# Patient Record
Sex: FEMALE | Race: OTHER | NOT HISPANIC OR LATINO | URBAN - METROPOLITAN AREA
[De-identification: names, ages, dates, MRNs, and addresses within clinical notes are randomized per-mention and may not be internally consistent; named-entity substitution may affect disease eponyms.]

---

## 2019-08-17 ENCOUNTER — EMERGENCY (EMERGENCY)
Facility: HOSPITAL | Age: 44
LOS: 1 days | Discharge: HOME | End: 2019-08-17
Attending: EMERGENCY MEDICINE
Payer: MEDICARE

## 2019-08-17 VITALS
WEIGHT: 95.02 LBS | HEART RATE: 93 BPM | OXYGEN SATURATION: 100 % | RESPIRATION RATE: 16 BRPM | TEMPERATURE: 98 F | DIASTOLIC BLOOD PRESSURE: 81 MMHG | SYSTOLIC BLOOD PRESSURE: 131 MMHG | HEIGHT: 60 IN

## 2019-08-17 LAB
ALBUMIN SERPL ELPH-MCNC: 3.7 G/DL — SIGNIFICANT CHANGE UP (ref 3.3–5)
ALP SERPL-CCNC: 145 U/L — HIGH (ref 40–120)
ALT FLD-CCNC: 18 U/L — SIGNIFICANT CHANGE UP (ref 10–45)
ANION GAP SERPL CALC-SCNC: 10 MMOL/L — SIGNIFICANT CHANGE UP (ref 5–17)
APPEARANCE UR: CLEAR — SIGNIFICANT CHANGE UP
APTT BLD: 38.7 SEC — HIGH (ref 27.5–36.3)
AST SERPL-CCNC: 22 U/L — SIGNIFICANT CHANGE UP (ref 10–40)
BACTERIA # UR AUTO: NEGATIVE — SIGNIFICANT CHANGE UP
BASOPHILS # BLD AUTO: 0 K/UL — SIGNIFICANT CHANGE UP (ref 0–0.2)
BASOPHILS NFR BLD AUTO: 0 % — SIGNIFICANT CHANGE UP (ref 0–2)
BILIRUB SERPL-MCNC: 0.2 MG/DL — SIGNIFICANT CHANGE UP (ref 0.2–1.2)
BILIRUB UR-MCNC: NEGATIVE — SIGNIFICANT CHANGE UP
BUN SERPL-MCNC: 12 MG/DL — SIGNIFICANT CHANGE UP (ref 7–23)
CALCIUM SERPL-MCNC: 9.1 MG/DL — SIGNIFICANT CHANGE UP (ref 8.4–10.5)
CHLORIDE SERPL-SCNC: 105 MMOL/L — SIGNIFICANT CHANGE UP (ref 96–108)
CO2 SERPL-SCNC: 27 MMOL/L — SIGNIFICANT CHANGE UP (ref 22–31)
COLOR SPEC: COLORLESS — SIGNIFICANT CHANGE UP
CREAT SERPL-MCNC: 0.63 MG/DL — SIGNIFICANT CHANGE UP (ref 0.5–1.3)
DIFF PNL FLD: NEGATIVE — SIGNIFICANT CHANGE UP
EOSINOPHIL # BLD AUTO: 0.1 K/UL — SIGNIFICANT CHANGE UP (ref 0–0.5)
EOSINOPHIL NFR BLD AUTO: 0.9 % — SIGNIFICANT CHANGE UP (ref 0–6)
EPI CELLS # UR: 1 /HPF — SIGNIFICANT CHANGE UP
GLUCOSE SERPL-MCNC: 100 MG/DL — HIGH (ref 70–99)
GLUCOSE UR QL: NEGATIVE — SIGNIFICANT CHANGE UP
HCT VFR BLD CALC: 37.7 % — SIGNIFICANT CHANGE UP (ref 34.5–45)
HGB BLD-MCNC: 11.9 G/DL — SIGNIFICANT CHANGE UP (ref 11.5–15.5)
HYALINE CASTS # UR AUTO: 0 /LPF — SIGNIFICANT CHANGE UP (ref 0–2)
INR BLD: 0.97 RATIO — SIGNIFICANT CHANGE UP (ref 0.88–1.16)
KETONES UR-MCNC: NEGATIVE — SIGNIFICANT CHANGE UP
LEUKOCYTE ESTERASE UR-ACNC: NEGATIVE — SIGNIFICANT CHANGE UP
LYMPHOCYTES # BLD AUTO: 1 K/UL — SIGNIFICANT CHANGE UP (ref 1–3.3)
LYMPHOCYTES # BLD AUTO: 14.9 % — SIGNIFICANT CHANGE UP (ref 13–44)
MCHC RBC-ENTMCNC: 29.7 PG — SIGNIFICANT CHANGE UP (ref 27–34)
MCHC RBC-ENTMCNC: 31.7 GM/DL — LOW (ref 32–36)
MCV RBC AUTO: 93.7 FL — SIGNIFICANT CHANGE UP (ref 80–100)
MONOCYTES # BLD AUTO: 0.4 K/UL — SIGNIFICANT CHANGE UP (ref 0–0.9)
MONOCYTES NFR BLD AUTO: 6.3 % — SIGNIFICANT CHANGE UP (ref 2–14)
NEUTROPHILS # BLD AUTO: 5.1 K/UL — SIGNIFICANT CHANGE UP (ref 1.8–7.4)
NEUTROPHILS NFR BLD AUTO: 77.9 % — HIGH (ref 43–77)
NITRITE UR-MCNC: NEGATIVE — SIGNIFICANT CHANGE UP
PH UR: 7.5 — SIGNIFICANT CHANGE UP (ref 5–8)
PLATELET # BLD AUTO: 257 K/UL — SIGNIFICANT CHANGE UP (ref 150–400)
POTASSIUM SERPL-MCNC: 4.6 MMOL/L — SIGNIFICANT CHANGE UP (ref 3.5–5.3)
POTASSIUM SERPL-SCNC: 4.6 MMOL/L — SIGNIFICANT CHANGE UP (ref 3.5–5.3)
PROT SERPL-MCNC: 7 G/DL — SIGNIFICANT CHANGE UP (ref 6–8.3)
PROT UR-MCNC: NEGATIVE — SIGNIFICANT CHANGE UP
PROTHROM AB SERPL-ACNC: 11 SEC — SIGNIFICANT CHANGE UP (ref 10–12.9)
RBC # BLD: 4.02 M/UL — SIGNIFICANT CHANGE UP (ref 3.8–5.2)
RBC # FLD: 12.7 % — SIGNIFICANT CHANGE UP (ref 10.3–14.5)
RBC CASTS # UR COMP ASSIST: 7 /HPF — HIGH (ref 0–4)
SODIUM SERPL-SCNC: 142 MMOL/L — SIGNIFICANT CHANGE UP (ref 135–145)
SP GR SPEC: 1.01 — SIGNIFICANT CHANGE UP (ref 1.01–1.02)
UROBILINOGEN FLD QL: NEGATIVE — SIGNIFICANT CHANGE UP
WBC # BLD: 6.5 K/UL — SIGNIFICANT CHANGE UP (ref 3.8–10.5)
WBC # FLD AUTO: 6.5 K/UL — SIGNIFICANT CHANGE UP (ref 3.8–10.5)
WBC UR QL: 1 /HPF — SIGNIFICANT CHANGE UP (ref 0–5)

## 2019-08-17 PROCEDURE — 99283 EMERGENCY DEPT VISIT LOW MDM: CPT

## 2019-08-17 PROCEDURE — 85027 COMPLETE CBC AUTOMATED: CPT

## 2019-08-17 PROCEDURE — 85730 THROMBOPLASTIN TIME PARTIAL: CPT

## 2019-08-17 PROCEDURE — 99284 EMERGENCY DEPT VISIT MOD MDM: CPT

## 2019-08-17 PROCEDURE — 81001 URINALYSIS AUTO W/SCOPE: CPT

## 2019-08-17 PROCEDURE — 85610 PROTHROMBIN TIME: CPT

## 2019-08-17 PROCEDURE — 80053 COMPREHEN METABOLIC PANEL: CPT

## 2019-08-17 RX ORDER — DIPHENHYDRAMINE HCL 50 MG
1 CAPSULE ORAL
Qty: 25 | Refills: 0
Start: 2019-08-17

## 2019-08-17 RX ORDER — DIPHENHYDRAMINE HCL 50 MG
50 CAPSULE ORAL ONCE
Refills: 0 | Status: COMPLETED | OUTPATIENT
Start: 2019-08-17 | End: 2019-08-17

## 2019-08-17 RX ADMIN — Medication 50 MILLIGRAM(S): at 16:16

## 2019-08-17 NOTE — ED PROVIDER NOTE - CLINICAL SUMMARY MEDICAL DECISION MAKING FREE TEXT BOX
Patient. with increasing dystonia in b/l arms.  Dystonia improved in the ED.  Neurology evalulated pt. and pt. to f/u with her neurologist as an outpatient.  Patient. prescribed benadryl for symptomatic relief.  Patient. labs wnl including u/a.  Patient. stable for outpatient f/u of acute on chronic dystonia that has improved in the ED.

## 2019-08-17 NOTE — ED ADULT NURSE NOTE - OBJECTIVE STATEMENT
Pt bib parents for eval of worsening dystonia.  She was dx'd January 2018 with this and reports that sx worsened since March of this year.  She is alert and interactive, with almost constant, uncontrolled  movement of arms.  Multiple abrasions present on anterior aspect of both lower legs, which she states is from frequent falls @ home.

## 2019-08-17 NOTE — ED PROVIDER NOTE - PROGRESS NOTE DETAILS
Patient. feels improved.  Patient. seen by neurology who agrees with outpatient f/u and pt. is improved.  Patient. stable and will receive benadryl for outpatient as needed.  Patient. has outpatient neurologist who she follows with.

## 2019-08-17 NOTE — CONSULT NOTE ADULT - SUBJECTIVE AND OBJECTIVE BOX
HPI:  44F w/ PMH of ?Tardive Dystonia (Follows w/ Dr. Turner at [facility]) presents here complaining of worsening of her dystonic contractions.     Patient states shes had worsening of her dystonic movements for the past two weeks. It has progressed to the point where she needed to come into the hospital. Last appointment w/ her Movement Disorder specialist, Dr. Romero. She is currently on Ingrezza 80mg. She was recently started on Tetrabenazine however patient states she stopped taking it because it has not been helping. Patient first diagnosed w/ Dystonia in June 2018 and states her disease is worsening.     A 10-system ROS was performed and is negative except for those items noted above and/or in the HPI.    SOCIAL HISTORY:   No smoke, drink, drugs     MEDICATIONS:   Home Medications:    VITALS & EXAMINATION:  T(F): 98.3  HR: 93  BP: 131/81  RR: 16  SpO2: 100%    Physical Exam:   General appearance: distressed, crying  Cardiac: RRR. No carotid bruits.   Pulm: CTAB              Neurologic:  - MS:  Alert & Oriented to Name, Hospital, today's date. Names Watch, Badge, Pen. Follows commands demonstrating two fingers and points to window; Speech is fluent with intact naming, repetition, and comprehension;   - CN II-XII:  VFF, EOMI, PERRLA, V1-V3 intact, no facial asymmetry, t/p midline, SCM/trap intact.  - Motor:  Choreiform movements bilaterally (upper > lower extremities). Atrophied legs. Full strength throughout.   - Reflexes:  2+ and symmetric bilaterally at the biceps, triceps, brachioradialis, knees, and ankles.  Plantar responses flexor.  - Sensory:  Intact to light touch, pin prick, vibration, and joint-position sense throughout.  - Coordination:  Finger-nose-finger and heel-knee-shin intact without dysmetria.  Rapid alternating hand movements intact.  - Gait:   Deferred     LABS:  08-17 Na 142  08-17 K 4.6  08-17 P --  08-17 Mg --  08-17 Ca 9.1  08-17 Cr 0.63      08-17 WBC 6.5  08-17 HgB 11.9; Hct 37.7; MCV 93.7  08-17 Plt 257    08-17 PT 11.0   08-17 INR 0.97     08-17 aPTT 38.7            IMAGING:

## 2019-08-17 NOTE — ED PROVIDER NOTE - ATTENDING CONTRIBUTION TO CARE
I, Matthew Barrientos, performed a history and physical exam of the patient and discussed their management with the resident and /or advanced care provider. I reviewed the resident and /or ACP's note and agree with the documented findings and plan of care. My medical decison making and observations are found above.

## 2019-08-17 NOTE — ED PROVIDER NOTE - PHYSICAL EXAMINATION
NAD, VSS, A&Ox3, + Generalized dystonia to b/l upper extremities. Diminished sensroy of RUE. Lungs clear. ABD soft, non tender. 5/5 strength of all extremities.

## 2019-08-17 NOTE — CONSULT NOTE ADULT - ASSESSMENT
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 Turner at [place]) presents here complaining of worsening of her dystonic contractions.     Impression: Worsening Tardive Dystonia w/ no clear exacerbating source.     Plan:   [] Patient can follow up w/ outpatient Neurologist at [facility], Dr. Romero, to discuss alternative treatment options. No acute indication for hospitalization.

## 2019-08-17 NOTE — ED ADULT NURSE NOTE - CHPI ED NUR SYMPTOMS NEG
no nausea/no dizziness/no vomiting/no loss of consciousness/no weakness/no blurred vision/no change in level of consciousness/no fever/no confusion

## 2019-08-17 NOTE — ED PROVIDER NOTE - OBJECTIVE STATEMENT
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 Pt stated she's right sided dystonia since 1/2019 and noticed gradual worsen both arm dystonia. She is on f/u with [facility] (last visit was 2 1/2weeks ago) and started new medication, Ingrezza for Dystonia without improvement. Pt reported her symptom's worsen with sitting or standing with right sided headache/back pain and intermittent SOB. Denies fever, chills or cough/ congestion. Denies CP/ABD pain or N/V/D. Denies urinary burning pain, but she feels frequent urination often.

## 2024-09-03 NOTE — ED ADULT NURSE NOTE - NSIMPLEMENTINTERV_GEN_ALL_ED
Yoly MIRANDA JUANITA Tipton has an upcoming lab appointment:    Future Appointments   Date Time Provider Department Center   9/6/2024  1:00 PM CR LAB [facility] CR   11/7/2024  3:30 PM Cristopher Goldberg MD [facility]   1/10/2025  8:00 AM Vivian Alcocer PA-C [facility]   Per virtual visit encounter from 8/30/24:  Plan:  Establish with an BED clinic/therapist - letter will need to be provided and PATRICIA   Set up a nurse only visit at the closest [facility] to get weight, blood pressure and pulse taken. Please ask that they forward the results.   Get lab drawn  3.   Have 3 meals   4.   Continue wegovy 1.7 mg. Has enough through November  FOLLOW-UP:  January with Vivian Alcocer PA-C      There is no order available. Please review and place either future orders or HMPO (Review of Health Maintenance Protocol Orders), as appropriate.    Health Maintenance Due   Topic    ANNUAL REVIEW OF HM ORDERS     HIV SCREENING     HEPATITIS C SCREENING     CHLAMYDIA SCREENING      Thank You,    Shirin COCHRAN III  [facility]  P: [phone]    Implemented All Fall with Harm Risk Interventions:  [place] to call system. Call bell, personal items and telephone within reach. Instruct patient to call for assistance. Room bathroom lighting operational. Non-slip footwear when patient is off stretcher. Physically safe environment: no spills, clutter or unnecessary equipment. Stretcher in lowest position, wheels locked, appropriate side rails in place. Provide visual cue, wrist band, yellow gown, etc. Monitor gait and stability. Monitor for mental status changes and reorient to person, place, and time. Review medications for side effects contributing to fall risk. Reinforce activity limits and safety measures with patient and family. Provide visual clues: red socks.